# Patient Record
Sex: FEMALE | Race: BLACK OR AFRICAN AMERICAN
[De-identification: names, ages, dates, MRNs, and addresses within clinical notes are randomized per-mention and may not be internally consistent; named-entity substitution may affect disease eponyms.]

---

## 2018-04-27 ENCOUNTER — HOSPITAL ENCOUNTER (EMERGENCY)
Dept: HOSPITAL 54 - ER | Age: 38
LOS: 1 days | Discharge: HOME | End: 2018-04-28
Payer: MEDICAID

## 2018-04-27 VITALS — BODY MASS INDEX: 29.63 KG/M2 | WEIGHT: 161 LBS | HEIGHT: 62 IN

## 2018-04-27 DIAGNOSIS — F12.10: ICD-10-CM

## 2018-04-27 DIAGNOSIS — F29: Primary | ICD-10-CM

## 2018-04-27 DIAGNOSIS — F15.10: ICD-10-CM

## 2018-04-27 DIAGNOSIS — R41.82: ICD-10-CM

## 2018-04-27 LAB
ALBUMIN SERPL BCP-MCNC: 3.9 G/DL (ref 3.4–5)
ALP SERPL-CCNC: 84 U/L (ref 46–116)
ALT SERPL W P-5'-P-CCNC: 26 U/L (ref 12–78)
APAP SERPL-MCNC: < 2 UG/ML (ref 10–30)
AST SERPL W P-5'-P-CCNC: 22 U/L (ref 15–37)
BASOPHILS # BLD AUTO: 0.1 /CMM (ref 0–0.2)
BASOPHILS NFR BLD AUTO: 2.2 % (ref 0–2)
BILIRUB DIRECT SERPL-MCNC: 0.1 MG/DL (ref 0–0.2)
BILIRUB SERPL-MCNC: 0.2 MG/DL (ref 0.2–1)
BUN SERPL-MCNC: 12 MG/DL (ref 7–18)
CALCIUM SERPL-MCNC: 8.5 MG/DL (ref 8.5–10.1)
CHLORIDE SERPL-SCNC: 105 MMOL/L (ref 98–107)
CK MB SERPL-MCNC: 1.2 NG/ML (ref 0–3.6)
CK SERPL-CCNC: 316 U/L (ref 26–192)
CO2 SERPL-SCNC: 23 MMOL/L (ref 21–32)
CREAT SERPL-MCNC: 1 MG/DL (ref 0.6–1.3)
EOSINOPHIL NFR BLD AUTO: 4.8 % (ref 0–6)
ETHANOL SERPL-MCNC: 79 MG/DL (ref 0–0)
GLUCOSE SERPL-MCNC: 88 MG/DL (ref 74–106)
HCT VFR BLD AUTO: 39 % (ref 33–45)
HGB BLD-MCNC: 12.7 G/DL (ref 11.5–14.8)
LYMPHOCYTES NFR BLD AUTO: 2.2 /CMM (ref 0.8–4.8)
LYMPHOCYTES NFR BLD AUTO: 39.9 % (ref 20–44)
MCHC RBC AUTO-ENTMCNC: 33 G/DL (ref 31–36)
MCV RBC AUTO: 84 FL (ref 82–100)
MONOCYTES NFR BLD AUTO: 0.4 /CMM (ref 0.1–1.3)
MONOCYTES NFR BLD AUTO: 7.7 % (ref 2–12)
NEUTROPHILS # BLD AUTO: 2.5 /CMM (ref 1.8–8.9)
NEUTROPHILS NFR BLD AUTO: 45.4 % (ref 43–81)
PH UR STRIP: 5.5 [PH] (ref 5–8)
PLATELET # BLD AUTO: 318 /CMM (ref 150–450)
POTASSIUM SERPL-SCNC: 3 MMOL/L (ref 3.5–5.1)
PROT SERPL-MCNC: 8 G/DL (ref 6.4–8.2)
RBC # BLD AUTO: 4.58 MIL/UL (ref 4–5.2)
RDW COEFFICIENT OF VARIATION: 12.9 (ref 11.5–15)
SALICYLATES SERPL-MCNC: 2.1 MG/DL (ref 2.8–20)
SODIUM SERPL-SCNC: 140 MMOL/L (ref 136–145)
UROBILINOGEN UR STRIP-MCNC: 0.2 EU/DL
WBC NRBC COR # BLD AUTO: 5.5 K/UL (ref 4.3–11)

## 2018-04-27 PROCEDURE — G0480 DRUG TEST DEF 1-7 CLASSES: HCPCS

## 2018-04-27 PROCEDURE — Z7610: HCPCS

## 2018-04-27 PROCEDURE — A4606 OXYGEN PROBE USED W OXIMETER: HCPCS

## 2018-04-27 RX ADMIN — POTASSIUM CHLORIDE SCH MLS/HR: 200 INJECTION, SOLUTION INTRAVENOUS at 22:50

## 2018-04-27 RX ADMIN — POTASSIUM CHLORIDE SCH MLS/HR: 200 INJECTION, SOLUTION INTRAVENOUS at 21:50

## 2018-04-27 NOTE — NUR
Patient was found smoking meth, naked, in a bathroom of a vacant house under 
construction today. Noted combative, agitated. Seen by MD for eval. Safely 
placed on 4 point restraints per protocol. VSS. Safety and comfort measures 
provided. Will monitor.

## 2018-04-27 NOTE — NUR
RECEIVED REPORT FROM NA/RN. PT RESTING WITH EYES CLOSED, BARELY AROUSABLE. 
NO /S OF TRAUMA OR DISTRESS NOTED. RESP EVEN AND UNLABORED. PLACED BACK ON 
MONITOR.

## 2018-04-28 VITALS — DIASTOLIC BLOOD PRESSURE: 78 MMHG | SYSTOLIC BLOOD PRESSURE: 130 MMHG

## 2018-04-28 NOTE — NUR
REPOSITIONED FOR COMFORT WITH HOB ELEVATED. NO S/S OF ACUTE DISTRESS NOTED. 
RESP EVEN AND UNLABORED AND ON MONITOR.

## 2024-02-14 NOTE — NUR
LAYING RT SIDE AT THIS TIME. RESP EVEN AND UNLABORED. [Normal Coordination] : normal coordination [Normal Sensation] : normal sensation [Normal DTR UE/LE] : normal DTR UE/LE  [Normal Mood and Affect] : normal mood and affect [Oriented] : oriented [Well Developed] : well developed [Well Nourished] : well nourished